# Patient Record
Sex: FEMALE | Race: WHITE | NOT HISPANIC OR LATINO | Employment: FULL TIME | URBAN - METROPOLITAN AREA
[De-identification: names, ages, dates, MRNs, and addresses within clinical notes are randomized per-mention and may not be internally consistent; named-entity substitution may affect disease eponyms.]

---

## 2018-03-12 NOTE — PROGRESS NOTES
"Subjective:       Patient ID: Janene Hernandez is a 32 y.o. female with no significant PMH who presents today to establish care.     Chief Complaint: Dizziness (Since October); Headache; Irritable Bowel Syndrome (diarrhea); Establish Care; and Leg Pain (Left side, from lower back to knee. (Previous torn ACL))    HPI Patient vertigo and headaches for several months (since October), but had it 4 years ago.  Her symptoms are more an unsteady feeling with a "swaying of objects around her".  Though was initially due to an ear infection as told to her by Urgent Care.  Finished antibiotics (amoxicillin) 3 days ago.  Never had brain imagine in the past.  She does have blurred vision with the vertigo. No allergy symptoms.  Was using Flonase and ibuprofen/tylenol with not much benefit.  Has worsening symptoms while standing for awhile or moving quickly. Usually has associated bitemporal, paranasal, and occipital headaches.  Occasional diarrhea with social situations (when she knows she does not have access to a bathroom or nervous about using public restrooms).  Having LBP radiating to left knee for the past few months.  Takes ibuprofen with mixed results.  No weakness in Le.  Patient denies f/c, n/v/d.  No chest pain or SOB.  No abdominal pain or dysuria.  No headaches or change in vision.  No dizziness.  No significant  weight gain or weight loss.  Remaining ROS negative.    Review of Systems   Constitutional: Negative for appetite change, chills, diaphoresis, fatigue, fever and unexpected weight change.   HENT: Negative for ear pain, hearing loss, sinus pain, tinnitus, trouble swallowing and voice change.    Eyes: Negative for photophobia, pain and visual disturbance.   Respiratory: Negative for chest tightness, shortness of breath and wheezing.    Cardiovascular: Negative for chest pain, palpitations and leg swelling.   Gastrointestinal: Positive for diarrhea. Negative for abdominal pain, blood in stool, constipation, " nausea and vomiting.   Endocrine: Negative for cold intolerance, heat intolerance, polydipsia, polyphagia and polyuria.   Genitourinary: Negative for decreased urine volume, difficulty urinating, dysuria, flank pain, hematuria, pelvic pain, vaginal bleeding, vaginal discharge and vaginal pain.   Musculoskeletal: Positive for arthralgias. Negative for gait problem, joint swelling, myalgias and neck pain.   Skin: Negative for rash.   Neurological: Positive for dizziness and headaches. Negative for tremors, syncope, weakness and numbness.   Hematological: Does not bruise/bleed easily.   Psychiatric/Behavioral: Negative for agitation, confusion and sleep disturbance. The patient is not nervous/anxious.        Objective:      Physical Exam   Constitutional: She is oriented to person, place, and time. She appears well-developed and well-nourished. No distress.   HENT:   Head: Normocephalic and atraumatic.   Right Ear: External ear normal.   Left Ear: External ear normal.   Nose: Nose normal.   Mouth/Throat: Oropharynx is clear and moist.   Eyes: Conjunctivae and EOM are normal. Pupils are equal, round, and reactive to light. No scleral icterus.   Neck: Normal range of motion. Neck supple. No JVD present. No thyromegaly present.   Cardiovascular: Normal rate, regular rhythm and intact distal pulses.  Exam reveals no gallop and no friction rub.    No murmur heard.  Pulmonary/Chest: Effort normal and breath sounds normal. No respiratory distress. She has no wheezes. She has no rales.   Abdominal: Soft. Bowel sounds are normal. She exhibits no distension. There is no tenderness. There is no rebound and no guarding.   Musculoskeletal: Normal range of motion. She exhibits no edema.   Lymphadenopathy:     She has no cervical adenopathy.   Neurological: She is alert and oriented to person, place, and time. No cranial nerve deficit or sensory deficit.   Skin: Skin is warm and dry. No rash noted. No erythema.   Psychiatric: She has  a normal mood and affect. Her behavior is normal. Thought content normal.       Assessment:       1. Encounter for wellness examination in adult    2. Need for Tdap vaccination    3. Screen for STD (sexually transmitted disease)    4. Recurrent vertigo    5. Low back pain radiating to left lower extremity    6. Chronic pain of left knee        Plan:   -Adult Wellness Exam - blood pressure and exam were stable.  I will order routine fasting labs.  We discussed STD screening - monogamous relationship.  BMI 33.95.  Discussed diet modification and exerciser for a BMI <25.   Just got a gym membership.  -ENT - Vertigo - recurrent.  Will check MRI of brain and refer for vestibular rehab.  She has a reaction to Claritin, so will avoid meclizine for now.  Will check Orthostatics today.  -MSK - BLP with LLE radiculopathy.  History Left ACL tear x 2 s/p repair 15 and 16 years old Meniscal tear left knee at 16 years old.  Continue with NSAIDs and refer to Physical medicine for evaluation and treatment.  Consider muscle relaxer.  -GI - Irritable Bowel Syndrome - mostly with social bathroom situations as above.  Monitor for now.  -GYN - Last Pap was last was normal 5-6 years ago and has follow up soon (had abnormal paps in the past). We discussed HPV vaccinations - never had them.  -HCM - We discussed Flu (declined) and Tdap (today) vaccinations.

## 2018-03-13 ENCOUNTER — LAB VISIT (OUTPATIENT)
Dept: LAB | Facility: OTHER | Age: 33
End: 2018-03-13
Attending: INTERNAL MEDICINE
Payer: COMMERCIAL

## 2018-03-13 ENCOUNTER — OFFICE VISIT (OUTPATIENT)
Dept: INTERNAL MEDICINE | Facility: CLINIC | Age: 33
End: 2018-03-13
Payer: COMMERCIAL

## 2018-03-13 ENCOUNTER — TELEPHONE (OUTPATIENT)
Dept: INTERNAL MEDICINE | Facility: CLINIC | Age: 33
End: 2018-03-13

## 2018-03-13 VITALS
BODY MASS INDEX: 33.8 KG/M2 | OXYGEN SATURATION: 98 % | HEART RATE: 76 BPM | WEIGHT: 210.31 LBS | SYSTOLIC BLOOD PRESSURE: 106 MMHG | HEIGHT: 66 IN | DIASTOLIC BLOOD PRESSURE: 70 MMHG

## 2018-03-13 DIAGNOSIS — M25.562 CHRONIC PAIN OF LEFT KNEE: ICD-10-CM

## 2018-03-13 DIAGNOSIS — Z00.00 ENCOUNTER FOR WELLNESS EXAMINATION IN ADULT: Primary | ICD-10-CM

## 2018-03-13 DIAGNOSIS — Z23 NEED FOR TDAP VACCINATION: ICD-10-CM

## 2018-03-13 DIAGNOSIS — R42 RECURRENT VERTIGO: ICD-10-CM

## 2018-03-13 DIAGNOSIS — G89.29 CHRONIC PAIN OF LEFT KNEE: ICD-10-CM

## 2018-03-13 DIAGNOSIS — Z00.00 ENCOUNTER FOR WELLNESS EXAMINATION IN ADULT: ICD-10-CM

## 2018-03-13 DIAGNOSIS — Z11.3 SCREEN FOR STD (SEXUALLY TRANSMITTED DISEASE): ICD-10-CM

## 2018-03-13 DIAGNOSIS — M79.605 LOW BACK PAIN RADIATING TO LEFT LOWER EXTREMITY: ICD-10-CM

## 2018-03-13 DIAGNOSIS — M54.50 LOW BACK PAIN RADIATING TO LEFT LOWER EXTREMITY: ICD-10-CM

## 2018-03-13 LAB
25(OH)D3+25(OH)D2 SERPL-MCNC: 9 NG/ML
ALBUMIN SERPL BCP-MCNC: 4.2 G/DL
ALP SERPL-CCNC: 78 U/L
ALT SERPL W/O P-5'-P-CCNC: 20 U/L
ANION GAP SERPL CALC-SCNC: 9 MMOL/L
AST SERPL-CCNC: 20 U/L
BASOPHILS # BLD AUTO: 0.02 K/UL
BASOPHILS NFR BLD: 0.2 %
BILIRUB SERPL-MCNC: 1.1 MG/DL
BUN SERPL-MCNC: 10 MG/DL
CALCIUM SERPL-MCNC: 9.5 MG/DL
CHLORIDE SERPL-SCNC: 103 MMOL/L
CHOLEST SERPL-MCNC: 208 MG/DL
CHOLEST/HDLC SERPL: 3.2 {RATIO}
CO2 SERPL-SCNC: 26 MMOL/L
CREAT SERPL-MCNC: 0.8 MG/DL
DIFFERENTIAL METHOD: ABNORMAL
EOSINOPHIL # BLD AUTO: 0.1 K/UL
EOSINOPHIL NFR BLD: 1 %
ERYTHROCYTE [DISTWIDTH] IN BLOOD BY AUTOMATED COUNT: 13.1 %
EST. GFR  (AFRICAN AMERICAN): >60 ML/MIN/1.73 M^2
EST. GFR  (NON AFRICAN AMERICAN): >60 ML/MIN/1.73 M^2
GLUCOSE SERPL-MCNC: 102 MG/DL
HCT VFR BLD AUTO: 42.3 %
HDLC SERPL-MCNC: 66 MG/DL
HDLC SERPL: 31.7 %
HGB BLD-MCNC: 13.9 G/DL
LDLC SERPL CALC-MCNC: 125.4 MG/DL
LYMPHOCYTES # BLD AUTO: 1.9 K/UL
LYMPHOCYTES NFR BLD: 23.7 %
MCH RBC QN AUTO: 31.3 PG
MCHC RBC AUTO-ENTMCNC: 32.9 G/DL
MCV RBC AUTO: 95 FL
MONOCYTES # BLD AUTO: 0.7 K/UL
MONOCYTES NFR BLD: 8.7 %
NEUTROPHILS # BLD AUTO: 5.4 K/UL
NEUTROPHILS NFR BLD: 66.2 %
NONHDLC SERPL-MCNC: 142 MG/DL
PLATELET # BLD AUTO: 343 K/UL
PMV BLD AUTO: 11.2 FL
POTASSIUM SERPL-SCNC: 3.8 MMOL/L
PROT SERPL-MCNC: 7.9 G/DL
RBC # BLD AUTO: 4.44 M/UL
SODIUM SERPL-SCNC: 138 MMOL/L
TRIGL SERPL-MCNC: 83 MG/DL
TSH SERPL DL<=0.005 MIU/L-ACNC: 1.22 UIU/ML
WBC # BLD AUTO: 8.15 K/UL

## 2018-03-13 PROCEDURE — 80053 COMPREHEN METABOLIC PANEL: CPT

## 2018-03-13 PROCEDURE — 90715 TDAP VACCINE 7 YRS/> IM: CPT | Mod: S$GLB,,, | Performed by: INTERNAL MEDICINE

## 2018-03-13 PROCEDURE — 82306 VITAMIN D 25 HYDROXY: CPT

## 2018-03-13 PROCEDURE — 85025 COMPLETE CBC W/AUTO DIFF WBC: CPT

## 2018-03-13 PROCEDURE — 84443 ASSAY THYROID STIM HORMONE: CPT

## 2018-03-13 PROCEDURE — 36415 COLL VENOUS BLD VENIPUNCTURE: CPT

## 2018-03-13 PROCEDURE — 99999 PR PBB SHADOW E&M-NEW PATIENT-LVL IV: CPT | Mod: PBBFAC,,, | Performed by: INTERNAL MEDICINE

## 2018-03-13 PROCEDURE — 80061 LIPID PANEL: CPT

## 2018-03-13 PROCEDURE — 90471 IMMUNIZATION ADMIN: CPT | Mod: S$GLB,,, | Performed by: INTERNAL MEDICINE

## 2018-03-13 PROCEDURE — 99202 OFFICE O/P NEW SF 15 MIN: CPT | Mod: 25,S$GLB,, | Performed by: INTERNAL MEDICINE

## 2018-03-13 NOTE — PROGRESS NOTES
"Patient was given vaccine information sheet for the Tdap immunization. The area of injection was palpated using the acromion process as a landmark. This area was cleaned with alcohol. Using a 25g 1" safety needle, 0.5mL of the vaccine was placed into the Left Deltoid muscle. The injection site was dressed with a bandage. Patient experienced no complications and was discharged in stable condition. Tdap Lot: [id number] Exp: 5/2/19    "

## 2018-03-13 NOTE — PATIENT INSTRUCTIONS
"Your exam was overall normal today.  Your weight and BMI are elevated.  Continue diet modification and exercise with a goal BMI of less than 25.  Your blood pressure was good.  I will order routine fasting labs today - at least 6-8 hours of fasting.  We will give you the Tdap vaccine today.  For your recurrent vertigo, I will schedule your to have an MRI of the brain (with contrast) and refer you to Occupational therapy for Vestibular Rehab.  For your back pain and knee pain, I will refer you to Physical Medicine for evaluation and treatment.  Return in 3 months - sooner if needed.  Please come at least 15-20 minutes before your scheduled appointment time.      Understanding Body Mass Index (BMI)  Body mass index (BMI) is a method of screening for a weight category using the ratio of your height to your weight. The BMI is a measure of overweight that is corrected for height. Knowing your BMI is a way to tell if you are at a healthy weight. The higher your BMI, the greater your risk for weight-related health problems.  What BMI means  · BMI below 18.5: Underweight  · BMI 18.5 to 24.9: Healthy weight or "ideal body weight"   · BMI 25 to 29.9: Overweight  · BMI 30 and over: Obese  · BMI 40 and over: Severe obesity   Online BMI Calculators  Find your BMI with an online BMI calculator tool, such as these from the CDC:  · BMI calculator for adults  · BMI calculator for children and teens   Using the BMI chart  To figure out your BMI, find your height and weight (or the numbers closest to them) on the table below. Follow each column of numbers to where your height and weight meet on the table. That is your BMI.    Date Last Reviewed: 7/1/2016  © 4989-8589 [employer]. [address] [place] [address], [place] [address]. All rights reserved. This information is not intended as a substitute for professional medical care. Always follow your healthcare professional's instructions.        "

## 2018-03-13 NOTE — TELEPHONE ENCOUNTER
----- Message from Angelique Wood sent at 3/13/2018  1:53 PM CDT -----  Good afternoon, patient has orders for Occupational Therapy. Recurrent vertigo should be under Physical Therapy. Please correct or correct me if I'm wrong.     Thanks

## 2018-03-14 ENCOUNTER — TELEPHONE (OUTPATIENT)
Dept: INTERNAL MEDICINE | Facility: CLINIC | Age: 33
End: 2018-03-14

## 2018-03-14 DIAGNOSIS — R42 VERTIGO: ICD-10-CM

## 2018-03-14 DIAGNOSIS — E55.9 VITAMIN D DEFICIENCY: Primary | ICD-10-CM

## 2018-03-14 DIAGNOSIS — E55.9 VITAMIN D DEFICIENCY: ICD-10-CM

## 2018-03-14 RX ORDER — ERGOCALCIFEROL 1.25 MG/1
50000 CAPSULE ORAL
Qty: 8 CAPSULE | Refills: 0 | Status: SHIPPED | OUTPATIENT
Start: 2018-03-14 | End: 2018-05-13

## 2018-03-14 RX ORDER — ERGOCALCIFEROL 1.25 MG/1
50000 CAPSULE ORAL
Qty: 8 CAPSULE | Refills: 0
Start: 2018-03-14 | End: 2018-03-14 | Stop reason: SDUPTHER

## 2018-03-14 NOTE — TELEPHONE ENCOUNTER
----- Message from Mayo Raines MD sent at 3/14/2018  7:17 AM CDT -----  Please inform the patient of the following lab results:  Your vitamin D level was low.  I will prescribe Vitamin D2 at 50,000 units to take once a week for 8 weeks, then change to over the counter Vitamin D2 or D3 at 2000 units once a day.  We will follow the levels periodically.  Please let me know which pharmacy you would like the prescription sent to today.  Your total cholesterol is borderline high.  Continue with diet modification and exercise.    Mariam, I put in an order for PT as well.      Thank you.

## 2018-03-14 NOTE — TELEPHONE ENCOUNTER
Spoke with pt. Advised per Dr. Raines. Pt. verbalized understanding. She would like Rx sent to [facility] at [facility].

## 2018-03-16 ENCOUNTER — HOSPITAL ENCOUNTER (OUTPATIENT)
Dept: RADIOLOGY | Facility: OTHER | Age: 33
Discharge: HOME | End: 2018-03-16
Attending: INTERNAL MEDICINE
Payer: COMMERCIAL

## 2018-03-16 DIAGNOSIS — R42 RECURRENT VERTIGO: ICD-10-CM

## 2018-03-16 PROCEDURE — A9585 GADOBUTROL INJECTION: HCPCS | Performed by: INTERNAL MEDICINE

## 2018-03-16 PROCEDURE — 70553 MRI BRAIN STEM W/O & W/DYE: CPT | Mod: TC

## 2018-03-16 PROCEDURE — 70553 MRI BRAIN STEM W/O & W/DYE: CPT | Mod: 26,,, | Performed by: RADIOLOGY

## 2018-03-16 PROCEDURE — 25500020 PHARM REV CODE 255: Performed by: INTERNAL MEDICINE

## 2018-03-16 RX ORDER — GADOBUTROL 604.72 MG/ML
9.5 INJECTION INTRAVENOUS
Status: COMPLETED | OUTPATIENT
Start: 2018-03-16 | End: 2018-03-16

## 2018-03-16 RX ADMIN — GADOBUTROL 9.5 ML: 604.72 INJECTION INTRAVENOUS at 04:03

## 2018-03-19 ENCOUNTER — TELEPHONE (OUTPATIENT)
Dept: INTERNAL MEDICINE | Facility: CLINIC | Age: 33
End: 2018-03-19

## 2018-03-19 NOTE — TELEPHONE ENCOUNTER
----- Message from Mayo Raines MD sent at 3/17/2018  9:20 AM CDT -----  Regarding: MRI  Please let patient know that her MRI looked normal.  Thank you.

## 2018-03-21 ENCOUNTER — OFFICE VISIT (OUTPATIENT)
Dept: OBSTETRICS AND GYNECOLOGY | Facility: CLINIC | Age: 33
End: 2018-03-21
Attending: OBSTETRICS & GYNECOLOGY
Payer: COMMERCIAL

## 2018-03-21 VITALS
DIASTOLIC BLOOD PRESSURE: 80 MMHG | WEIGHT: 212.06 LBS | HEIGHT: 66 IN | SYSTOLIC BLOOD PRESSURE: 102 MMHG | BODY MASS INDEX: 34.08 KG/M2

## 2018-03-21 DIAGNOSIS — Z01.419 WELL WOMAN EXAM: Primary | ICD-10-CM

## 2018-03-21 PROCEDURE — 88175 CYTOPATH C/V AUTO FLUID REDO: CPT

## 2018-03-21 PROCEDURE — 99999 PR PBB SHADOW E&M-EST. PATIENT-LVL II: CPT | Mod: PBBFAC,,, | Performed by: OBSTETRICS & GYNECOLOGY

## 2018-03-21 PROCEDURE — 99385 PREV VISIT NEW AGE 18-39: CPT | Mod: S$GLB,,, | Performed by: OBSTETRICS & GYNECOLOGY

## 2018-03-21 PROCEDURE — 87624 HPV HI-RISK TYP POOLED RSLT: CPT

## 2018-03-21 NOTE — PROGRESS NOTES
"CC: Well woman exam    Janene Hernandez is a 32 y.o. female  presents for well woman exam.  LMP: Patient's last menstrual period was 2018 (exact date)..  No issues, problems, or complaints.  Ok with condoms for birth control.  Cycles regular;  Did have 2 cycles last month but has no prior history and feels like she was under a lot of stress.    History of abnormal pap about 15 years ago.  Biopsy negative per patient.  Last pap 6 years ago normal.    Past Medical History:   Diagnosis Date    Abnormal Pap smear of cervix      Past Surgical History:   Procedure Laterality Date    KNEE ARTHROSCOPY W/ ACL RECONSTRUCTION Left 2001    KNEE ARTHROSCOPY W/ ACL RECONSTRUCTION Left      Social History     Social History    Marital status: Single     Spouse name: N/A    Number of children: N/A    Years of education: N/A     Occupational History    Not on file.     Social History Main Topics    Smoking status: Former Smoker    Smokeless tobacco: Never Used    Alcohol use Yes    Drug use: Yes     Types: Marijuana    Sexual activity: Yes     Partners: Male     Birth control/ protection: Condom     Other Topics Concern    Not on file     Social History Narrative    No narrative on file     Family History   Problem Relation Age of Onset    Colon cancer Paternal Uncle     Breast cancer Neg Hx     Ovarian cancer Neg Hx      OB History      Para Term  AB Living    2       2      SAB TAB Ectopic Multiple Live Births                       /80   Ht 5' 6" (1.676 m)   Wt 96.2 kg (212 lb 1.3 oz)   LMP 2018 (Exact Date)   BMI 34.23 kg/m²       ROS:  GENERAL: Denies weight gain or weight loss. Feeling well overall.   SKIN: Denies rash or lesions.   HEAD: Denies head injury or headache.   NODES: Denies enlarged lymph nodes.   CHEST: Denies chest pain or shortness of breath.   CARDIOVASCULAR: Denies palpitations or left sided chest pain.   ABDOMEN: No abdominal pain, constipation, " diarrhea, nausea, vomiting or rectal bleeding.   URINARY: No frequency, dysuria, hematuria, or burning on urination.  REPRODUCTIVE: See HPI.   BREASTS: The patient performs breast self-examination and denies pain, lumps, or nipple discharge.   HEMATOLOGIC: No easy bruisability or excessive bleeding.   MUSCULOSKELETAL: Denies joint pain or swelling.   NEUROLOGIC: Denies syncope or weakness.   PSYCHIATRIC: Denies depression, anxiety or mood swings.    PHYSICAL EXAM:  APPEARANCE: Well nourished, well developed, in no acute distress.  AFFECT: WNL, alert and oriented x 3  SKIN: No acne or hirsutism  NECK: Neck symmetric without masses or thyromegaly  NODES: No inguinal, cervical, axillary, or femoral lymph node enlargement  CHEST: Good respiratory effect  ABDOMEN: Soft.  No tenderness or masses.  No hepatosplenomegaly.  No hernias.  BREASTS: Symmetrical, no skin changes or visible lesions.  No palpable masses, nipple discharge bilaterally.  PELVIC: Normal external genitalia without lesions.  Normal hair distribution.  Adequate perineal body, normal urethral meatus.  Vagina moist and well rugated without lesions or discharge.  Cervix pink, without lesions, discharge or tenderness.  No significant cystocele or rectocele.  Bimanual exam shows uterus to be normal size, regular, mobile and nontender.  Adnexa without masses or tenderness.    EXTREMITIES: No edema.    ASSESSMENT    ICD-10-CM ICD-9-CM    1. Well woman exam Z01.419 V72.31 HPV High Risk Genotypes, PCR      Liquid-based pap smear, screening         PLAN:  Well woman exam  -     HPV High Risk Genotypes, PCR  -     Liquid-based pap smear, screening    Patient was counseled today on A.C.S. Pap guidelines and recommendations for yearly pelvic exams, mammograms and monthly self breast exams; to see her PCP for other health maintenance.

## 2018-03-26 LAB
HPV16 AG SPEC QL: NEGATIVE
HPV16+18+H RISK 12 DNA CVX-IMP: NEGATIVE
HPV18 DNA SPEC QL NAA+PROBE: NEGATIVE

## 2018-04-03 ENCOUNTER — CLINICAL SUPPORT (OUTPATIENT)
Dept: REHABILITATION | Facility: HOSPITAL | Age: 33
End: 2018-04-03
Attending: INTERNAL MEDICINE
Payer: COMMERCIAL

## 2018-04-03 DIAGNOSIS — R42 VERTIGO: ICD-10-CM

## 2018-04-03 PROCEDURE — 97112 NEUROMUSCULAR REEDUCATION: CPT | Mod: PO

## 2018-04-03 PROCEDURE — 97162 PT EVAL MOD COMPLEX 30 MIN: CPT | Mod: PO

## 2018-04-03 NOTE — PROGRESS NOTES
"    PHYSICAL THERAPY INITIAL EVALUATION     Date: 04/03/2018  Name: Janene Hernandez  Clinic Number: [id number]    Visit #: 1   Start Time:  1117  Stop Time:  1200  Time in treatment: 53 minutes    Orders:    By Department     none Mayo Raines MD [facility]   Priority Start Date Expiration Date Referral Entered By   Routine 03/14/2018 12/31/2018 Mayo Raines MD   Visits Requested Visits Authorized Visits Completed Visits Scheduled   1 20 1 4   Procedure Information     Procedure Modifiers Provider Requested Approved   [id number] - Ambulatory consult to Physical Therapy   1 1   Diagnosis Information     Diagnosis   R42 (ICD-10-CM) - Vertigo         History   History of Present Illness per MD: HPI Patient vertigo and headaches for several months (since October), but had it 4 years ago.  Her symptoms are more an unsteady feeling with a "swaying of objects around her".  Though was initially due to an ear infection as told to her by Urgent Care.  Finished antibiotics (amoxicillin) 3 days ago.  Never had brain imagine in the past.  She does have blurred vision with the vertigo. No allergy symptoms.  Was using Flonase and ibuprofen/tylenol with not much benefit.  Has worsening symptoms while standing for awhile or moving quickly. Usually has associated bitemporal, paranasal, and occipital headaches.  Occasional diarrhea with social situations (when she knows she does not have access to a bathroom or nervous about using public restrooms).  Having LBP radiating to left knee for the past few months.  Takes ibuprofen with mixed results.  No weakness in Le.  Patient denies f/c, n/v/d.  No chest pain or SOB.  No abdominal pain or dysuria.  No headaches or     Treatment Diagnosis:   1. Vertigo         Past Medical History      Past Medical History:   Diagnosis Date    Abnormal Pap smear of cervix        Allergies  Review of patient's allergies indicates:   Allergen Reactions    Claritin " "[loratadine] Hives       Current Medication list:   Current Outpatient Prescriptions on File Prior to Visit   Medication Sig Dispense Refill    ergocalciferol (ERGOCALCIFEROL) 50,000 unit Cap Take 1 capsule (50,000 Units total) by mouth every 7 days. 8 capsule 0     No current facility-administered medications on file prior to visit.        Precautions: Standard, Fall      Prior Therapy: no    Diagnostic Tests: MRI   Impression     No significant intracranial abnormality identified.  Electronically signed by: NATI FRANCOIS MD  Date: 03/16/18       Living situation: when episodes occur  Work status:   Sports/Recreational Activities: no  Assistive devices/equipment no    Cultural, Spiritual, Developmental and Educational concerns: none  Abuse/Neglect, Nutritional concerns: none     Patient Therapy Goals: decrease dizziness    Subjective   Janene Hernandez states she experiences a sense of rocking as on a boat.  This can occur in any position.  Initially started on a cruise and lasted one month until treted for an ear infection.  Now these episodes occur at least 2x a week.  There is a HA which is associated with dizziness.  Dizziness last 60 seconds to a few minutes.  HA last until Ibruprofen "Kicks in"  How long has vertigo/dizziness been present ?  4 yrs  What provokes it?  unsure  What relieves it?  Time and Advil    Do you have headaches not associated with the dizziness?  Yes    Any visual problems other than wearing glasses?  no  Any loss of hearing:  no       Any tinnitus?  In the past  FALLS: How many in the last year?       1x after drinking too much and fell hitting her head.                        Did you feel light headed or dizzy?  unsure             Did you lose consciousness? unsure  Does darkness effect dizziness:  No  But excessive glare does bother her  Do you get dizzy reading?  no             Using the computer?   Yes sometimes                  Driving?  At night  Have you had recent " dental work?  no  Exercise routine prior to onset :no    PAIN  Location: torn ACL, sciatica.  Not associated with vertigo  Does the patient have any concerns of abuse      Objective   APPEARANCE UPON ARRIVAL:  32 y.o. White  female in NAD    BP:  nt    MENTATION:  AOC  No difficulty with concentration    POSTURE examination in standing:  Normal     GAIT:  Normal without any deviation or AD    CERVICAL ROM   WNL  AROM HIPS:  WFL  AROM KNEES WFL  AROM  ANKLES WFL     OCULOMOTOR  Smooth Pursuit:  some vertigo with gaze up and down  Saccadic eye movements: normal with some vertigo  Convergence/divergence:  normal  Vestibular Ocular Reflex: normal with some vertigo  Gaze nystagmus   normal      VESTIBULAR  Head impulse   negative   Head shake test   negative  [place]- [place]:   nt    CEREBELLAR SCREENS/ COORDINATION:   Heel to shin  RLE: normal   Heel to shin  LLE: normal   Rapid alternating movement RLE:normal  Rapid alternating movement LLE: normal   Rapid alternating movemen LUE: normal   Rapid alternating movement RUE: normal     SENSORY ORGANIZATION (Greater than 30 seconds)   Firm Surface Eyes Closed:Yes  Firm Surface Eyes Open: Yes  Foam Surface Eyes Open: Yes  Foam Surface Eyes Closed: Yes  Romberg FTEO: Yes  Romberg FTEC: Yes  Tandem Romberg EO: Yes  Tandem Romberg EC: Yes  Single Limb Support EO: Yes  Pt is able to tolerate strenuous perturbations: No       Treatment   EVALUATION COMPLETED                           Neuromm rehab including the following habituation exercises x 10 min    OCULOMOTOR to be performed 3 times daily  Smooth Pursuit:  3  reps x 30 seconds   Saccadic eye movements: 3  reps x 30 seconds    Vestibular Ocular Reflex: normal with some vertigo 3 reps x 30 seconds      Education   Patient was provided with a written copy of the above home exercises indicated in bold to perform as tolerated within limits of pain.  Exercises were reviewed and patient was able to demonstrate them prior to the end  of the session.  Pt was instructed in ways to improve safety of home environment to prevent falls  All of the patients questions were answered  Goals of therapy, the roles of PT and PTA, and the need for compliance of appointments, attendance policy and HEP was discussed with patient .  Patient expressed understanding and agreement with above education.      No barriers to learning or social/cultural issues were identified that could hinder therapy.    Assessment   Pt with dizziness of uncertain origin  felt no change in symptoms post therapy and suffered no adverse effects with treatment.   .  Pt presents with  An evolving clinical presentation with changing clinical characteristics which include: increased frequency and intensity of vertigo which is causing difficulty with ADLs, employment, home life, leisure pursuits.  Testing today seemed to indicate a strong visual component to vertigo so we will address this first.  In future additonal testing of semicircular canals will be performed        Medical necessity is demonstrated by the following IMPAIRMENTS/PROBLEM LIST:  1. Fall Risk - impaired balance   2. Requires skilled supervision to complete and progress HEP   Anticipated barriers to physical therapy: her work schedule limits time which she can come to therapy    G Code   na    Pt's spiritual, cultural and educational needs considered and pt agreeable to plan of care and goals as stated below:       Goals   Short term goals: 8 weeks, pt agrees to goals set.  Pt and caregivers will evaluate home for safety, including checking lighting and hazards on the floor such as rugs or cords and remove them.  Pt will report less intense vertiginous symptoms with visual stimulation  Pt will report less frequent vertigo and HA              Pt will report compliance with HEP                 Rehab Potential: good provided patient is compliant with HEP and PT appointments.      Plan   Pt will be seen 1-2 times per week for  8 weeks. Recommended Treatment Plan will include:  Manual soft tissue and/or joint mobilization  Therapeutic Exercise  Neuromuscular re-education          Individualized Home Exercise Program  Modalities: heat/ice, estim, US as needed         Pt education    PTA may be involved in patient's care as part of the Rehab Team.      History  Co-morbidities and personal factors that may impact the plan of care Examination  Body Structures and Functions, activity limitations and participation restrictions that may impact the plan of care Clinical Presentation   Co-morbidities: none        Personal Factors:   her job triggers symptoms Body Regions:vestibular    Body Systems:  balance    Participation Restrictions:   job     Activity limitations/ impairments:   Learning and applying knowledge  no deficits  General Tasks and Commands  no deficits  Communicationno deficits  Mobility:  Safe transitional movement/ ambulation impaired  Self care  no deficits  Domestic Life  Unable to participate fully due to fear of falling/ high fall potential  Interactions/Relationships:  No deficits   Community and Social Life:     Unable to participate fully due to    fear of falling/ high fall potential           evolving clinical presentation with changing clinical characteristics                      moderate   moderate moderate Decision Making/ Complexity Score:  moderate     Justine Denson, PT

## 2018-05-23 ENCOUNTER — DOCUMENTATION ONLY (OUTPATIENT)
Dept: REHABILITATION | Facility: HOSPITAL | Age: 33
End: 2018-05-23

## 2018-05-23 NOTE — PROGRESS NOTES
PHYSICAL THERAPY  Discharge  Date of Discharge 5/23/2018    Name: Janene Hernandez  [facility] #: [id number]    Pt was seen in Physical Therapy for initial evaluation on:4/3/18  Pt's last date of treatment in Physical Therapy was:4/3/18  Number of treatment sessions completed: 1  Reason for discharge:   Did not return for follow up  Status at Discharge:  Goals not met     Discharge update in functional G code not available due to unplanned discharge.

## 2018-05-29 PROBLEM — H66.90 OTITIS MEDIA: Status: ACTIVE | Noted: 2018-05-29

## 2018-05-29 PROBLEM — E66.9 OBESITY WITH BODY MASS INDEX 30 OR GREATER: Status: ACTIVE | Noted: 2018-05-29

## 2021-04-28 ENCOUNTER — PATIENT MESSAGE (OUTPATIENT)
Dept: RESEARCH | Facility: HOSPITAL | Age: 36
End: 2021-04-28